# Patient Record
Sex: FEMALE | Race: OTHER | NOT HISPANIC OR LATINO | URBAN - NONMETROPOLITAN AREA
[De-identification: names, ages, dates, MRNs, and addresses within clinical notes are randomized per-mention and may not be internally consistent; named-entity substitution may affect disease eponyms.]

---

## 2017-01-04 ENCOUNTER — CONSULT (OUTPATIENT)
Dept: CARDIOLOGY | Facility: CLINIC | Age: 32
End: 2017-01-04

## 2017-01-04 ENCOUNTER — APPOINTMENT (OUTPATIENT)
Dept: LAB | Facility: HOSPITAL | Age: 32
End: 2017-01-04

## 2017-01-04 VITALS
SYSTOLIC BLOOD PRESSURE: 110 MMHG | HEIGHT: 63 IN | BODY MASS INDEX: 43.77 KG/M2 | WEIGHT: 247 LBS | HEART RATE: 112 BPM | DIASTOLIC BLOOD PRESSURE: 84 MMHG

## 2017-01-04 DIAGNOSIS — E66.01 MORBID OBESITY DUE TO EXCESS CALORIES (HCC): ICD-10-CM

## 2017-01-04 DIAGNOSIS — R07.89 OTHER CHEST PAIN: ICD-10-CM

## 2017-01-04 DIAGNOSIS — R00.2 PALPITATIONS: Primary | ICD-10-CM

## 2017-01-04 LAB
ANION GAP SERPL CALCULATED.3IONS-SCNC: 10 MMOL/L (ref 3–11)
BUN BLD-MCNC: 14 MG/DL (ref 9–23)
BUN/CREAT SERPL: 20 (ref 7–25)
CALCIUM SPEC-SCNC: 9.4 MG/DL (ref 8.7–10.4)
CHLORIDE SERPL-SCNC: 102 MMOL/L (ref 99–109)
CO2 SERPL-SCNC: 25 MMOL/L (ref 20–31)
CREAT BLD-MCNC: 0.7 MG/DL (ref 0.6–1.3)
DEPRECATED RDW RBC AUTO: 41.1 FL (ref 37–54)
ERYTHROCYTE [DISTWIDTH] IN BLOOD BY AUTOMATED COUNT: 13.7 % (ref 11.3–14.5)
GFR SERPL CREATININE-BSD FRML MDRD: 117 ML/MIN/1.73
GFR SERPL CREATININE-BSD FRML MDRD: 96 ML/MIN/1.73
GLUCOSE BLD-MCNC: 93 MG/DL (ref 70–100)
HCT VFR BLD AUTO: 40.5 % (ref 34.5–44)
HGB BLD-MCNC: 13.5 G/DL (ref 11.5–15.5)
MCH RBC QN AUTO: 27.9 PG (ref 27–31)
MCHC RBC AUTO-ENTMCNC: 33.3 G/DL (ref 32–36)
MCV RBC AUTO: 83.7 FL (ref 80–99)
PLATELET # BLD AUTO: 235 10*3/MM3 (ref 150–450)
PMV BLD AUTO: 10.4 FL (ref 6–12)
POTASSIUM BLD-SCNC: 3.8 MMOL/L (ref 3.5–5.5)
RBC # BLD AUTO: 4.84 10*6/MM3 (ref 3.89–5.14)
SODIUM BLD-SCNC: 137 MMOL/L (ref 132–146)
T4 FREE SERPL-MCNC: 1.02 NG/DL (ref 0.89–1.76)
TSH SERPL DL<=0.05 MIU/L-ACNC: 2.29 MIU/ML (ref 0.35–5.35)
WBC NRBC COR # BLD: 8.19 10*3/MM3 (ref 3.5–10.8)

## 2017-01-04 PROCEDURE — 84443 ASSAY THYROID STIM HORMONE: CPT | Performed by: PHYSICIAN ASSISTANT

## 2017-01-04 PROCEDURE — 93000 ELECTROCARDIOGRAM COMPLETE: CPT | Performed by: INTERNAL MEDICINE

## 2017-01-04 PROCEDURE — 36415 COLL VENOUS BLD VENIPUNCTURE: CPT | Performed by: INTERNAL MEDICINE

## 2017-01-04 PROCEDURE — 85027 COMPLETE CBC AUTOMATED: CPT | Performed by: PHYSICIAN ASSISTANT

## 2017-01-04 PROCEDURE — 80048 BASIC METABOLIC PNL TOTAL CA: CPT | Performed by: PHYSICIAN ASSISTANT

## 2017-01-04 PROCEDURE — 99204 OFFICE O/P NEW MOD 45 MIN: CPT | Performed by: INTERNAL MEDICINE

## 2017-01-04 PROCEDURE — 84439 ASSAY OF FREE THYROXINE: CPT | Performed by: PHYSICIAN ASSISTANT

## 2017-01-04 NOTE — LETTER
2017     LESLI Anne  [address] [facility] [address]    Patient: Ct Leonard   YOB: 1985   Date of Visit: 2017       Dear LESLI Azar:    Thank you for referring Ct Leonard to me for evaluation. Below are the relevant portions of my assessment and plan of care.    If you have questions, please do not hesitate to call me. I look forward to following Ct along with you.         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    CC:  Chief Complaint   Patient presents with   • Consult     chest pain       PROBLEM LIST:  1. Palpitations - Previously followed by [facility] for SVT/paroxysmal atrial tachycardia   A. 2016 echo: normal EF, no hemodynamically significant valvular disease   B. 2016 GXT: walked 1 min 45 seconds, no arhythmia, no ischemic ST/T wave changes. Duke treadmill score -2 with very low exercise capacity.   C.  holter: 67 isolated VE, 57 SVT events - 22 couplets - avg. rate 102 range of .  2. Dyslipidemia  3. Psoriasis  4.   5. Surgical hx: gallbladder requiring transfusion  6. Previous workup for von Willebrand's stopped due to loss of insurance.  7. PCOS  8. Morbid obesity, BMI 41. Successful 50 pound weight loss over last 3 years    Allergies  Allergies   Allergen Reactions   • Clindamycin/Lincomycin Hives and Palpitations       Current Medications  No current outpatient prescriptions on file.     History of Present Illness   HPI  Patient is a pleasant 31-year-old female with a history of dyslipidemia, psoriasis, PCO OS, who presents in consultation at the recommendation of Ray Hobbs  LESLI for palpitations and chest pain.  She was previously evaluated by [facility] in 2014 for SVT and paroxysmal regular atrial tachycardia for which was stable until August 2016.  She began noticing that when she becomes overheated and attempts to exercise with her routine walking regimen she has tachycardia palpitations, chest discomfort, dyspnea, dizziness, and nausea to the point of dry heaving.  She has noticed that caffeine is an aggravating factor.  She was given a prescription of metoprolol but has not filled it.  Her primary care provider ordered echocardiogram and GXT for evaluation.  Echocardiogram was normal.  Her GXT was intermediate risk due to her poor exercise capacity.  She had no prior changes to her health that precipitated symptom onset.  She also acknowledges positional orthopnea on her left side.  Her left lower extremity was giving her issues with swelling and there was concern for DVT/PE.  Workup at [facility] showed a negative chest x-ray and no PE on CTA.  Signs rest there are no other alleviating factors.  She avoids alcohol, caffeine, decongestants, and did not use stimulants for her weight loss.  She denies history of CHF, DVT/PE, CVA/TIA, or rheumatic fever. No chance of pregnancy as  has undergone vasectomy.    ROS  Review of Systems   Constitution: Positive for malaise/fatigue.   Cardiovascular: Positive for chest pain, dyspnea on exertion, near-syncope and palpitations.   Endocrine: Positive for cold intolerance.   Skin: Positive for dry skin.   Musculoskeletal: Positive for back pain.   Gastrointestinal: Positive for nausea.   All other systems reviewed and are negative.      SOCIAL HX  Social History     Social History   • Marital status: Unknown     Spouse name: N/A   • Number of children: N/A   • Years of education: N/A     Occupational History   • Not on file.     Social History Main Topics   • Smoking status: Never Smoker   • Smokeless tobacco: Not on  "file   • Alcohol use No   • Drug use: No   • Sexual activity: Defer     Other Topics Concern   • Not on file     Social History Narrative   • No narrative on file       FAMILY HX  Family History   Problem Relation Age of Onset   • Stroke Mother        Vitals:    01/04/17 1359 01/04/17 1407 01/04/17 1408   BP: 122/86 114/82 110/84   BP Location: Right arm Left arm Left arm   Patient Position: Sitting Sitting Standing   Pulse: 113 (!) 128 112   Weight: 247 lb (112 kg)     Height: 63\" (160 cm)         PHYSICAL EXAMINATION:  Physical Exam   Constitutional: She is oriented to person, place, and time. She appears well-developed and well-nourished. No distress.   Morbidly obese   HENT:   Head: Normocephalic and atraumatic.   Nose: Nose normal.   Mouth/Throat: Uvula is midline, oropharynx is clear and moist and mucous membranes are normal.   Eyes: Conjunctivae and EOM are normal. Pupils are equal, round, and reactive to light. No scleral icterus.   Neck: Normal range of motion. Neck supple. No hepatojugular reflux and no JVD present. Carotid bruit is not present. No tracheal deviation present. No thyromegaly present.   Cardiovascular: Regular rhythm, S1 normal, S2 normal, intact distal pulses and normal pulses.  Tachycardia present.  PMI is not displaced.  Exam reveals no gallop, no distant heart sounds, no friction rub, no midsystolic click and no opening snap.    No murmur heard.  Pulses:       Radial pulses are 2+ on the right side, and 2+ on the left side.        Dorsalis pedis pulses are 2+ on the right side, and 2+ on the left side.        Posterior tibial pulses are 2+ on the right side, and 2+ on the left side.   Pulmonary/Chest: Effort normal and breath sounds normal. She has no wheezes. She has no rhonchi. She has no rales.   Abdominal: Soft. Bowel sounds are normal. She exhibits no mass. There is no tenderness. There is no guarding.   Musculoskeletal: Normal range of motion. She exhibits no edema or tenderness. "   Lymphadenopathy:     She has no cervical adenopathy.   Neurological: She is alert and oriented to person, place, and time.   Skin: Skin is warm, dry and intact. No rash noted. No cyanosis or erythema. Nails show no clubbing.   Psychiatric: She has a normal mood and affect. Her behavior is normal.   Nursing note and vitals reviewed.      Diagnostic Data:    ECG 12 Lead  Date/Time: 1/4/2017 3:41 PM  Performed by: DOMENICA SIN  Authorized by: DOMENICA SIN   Rhythm: sinus rhythm  BPM: 88  Clinical impression: non-specific ECG and low voltage  Comments: Nonspecific ST junctional depression          Outside facility GXT, echo, and Holter reviewed and listed in the problem list above.      ASSESSMENT:   Diagnosis Plan   1. Palpitations  CBC (No Diff)    TSH    T4, Free    Basic Metabolic Panel   2. Other chest pain     3. Morbid obesity due to excess calories         PLAN:  1. Exacerbation of her known history of paroxysmal atrial tachycardia likely due to deconditioned state. Records from PCP office did not show lab work to rule out underlying physiologic etiologies and she was previously being worked up for von Willebrands. No reported signs of melena, hematochezia, or hematuria. Lab work as above. If labs are normal can consider trial of metoprolol for symptomatic improvement at next office visit. Echo normal therefore will not re-eval with holter. Discussed avoiding aggravating factors and encouraged hydration.  2. GXT shows poor exercise capacity due to her obesity. Will defer further workup at this time.  3.  Commended her on her current weight loss and encouraged that she'll continue to improve her cardiovascular function as she progresses. Dietary recommendations provided.    Follow up with us in 6 months.    Scribed for Domenica Sin MD by AMY Rick. 1/4/2017  3:48 PM   Domenica Sin MD, [facility]

## 2017-01-04 NOTE — PROGRESS NOTES
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    CC:  Chief Complaint   Patient presents with   • Consult     chest pain       PROBLEM LIST:  1. Palpitations - Previously followed by [facility] for SVT/paroxysmal atrial tachycardia   A. 2016 echo: normal EF, no hemodynamically significant valvular disease   B. 2016 GXT: walked 1 min 45 seconds, no arhythmia, no ischemic ST/T wave changes. Duke treadmill score -2 with very low exercise capacity.   C.  holter: 67 isolated VE, 57 SVT events - 22 couplets - avg. rate 102 range of .  2. Dyslipidemia  3. Psoriasis  4.   5. Surgical hx: gallbladder requiring transfusion  6. Previous workup for von Willebrand's stopped due to loss of insurance.  7. PCOS  8. Morbid obesity, BMI 41. Successful 50 pound weight loss over last 3 years    Allergies  Allergies   Allergen Reactions   • Clindamycin/Lincomycin Hives and Palpitations       Current Medications  No current outpatient prescriptions on file.     History of Present Illness   HPI  Patient is a pleasant 31-year-old female with a history of dyslipidemia, psoriasis, PCO OS, who presents in consultation at the recommendation of Ray BALLESTEROS for palpitations and chest pain.  She was previously evaluated by [facility] in  for SVT and paroxysmal regular atrial tachycardia for which was stable until 2016.  She began noticing that when she becomes overheated and attempts to exercise with her routine walking regimen she has tachycardia palpitations, chest discomfort, dyspnea, dizziness, and nausea to the point of dry heaving.  She has noticed that caffeine is an aggravating factor.  She was given a prescription of metoprolol but has not filled it.  Her primary care  "provider ordered echocardiogram and GXT for evaluation.  Echocardiogram was normal.  Her GXT was intermediate risk due to her poor exercise capacity.  She had no prior changes to her health that precipitated symptom onset.  She also acknowledges positional orthopnea on her left side.  Her left lower extremity was giving her issues with swelling and there was concern for DVT/PE.  Workup at [facility] showed a negative chest x-ray and no PE on CTA.  Signs rest there are no other alleviating factors.  She avoids alcohol, caffeine, decongestants, and did not use stimulants for her weight loss.  She denies history of CHF, DVT/PE, CVA/TIA, or rheumatic fever. No chance of pregnancy as  has undergone vasectomy.    ROS  Review of Systems   Constitution: Positive for malaise/fatigue.   Cardiovascular: Positive for chest pain, dyspnea on exertion, near-syncope and palpitations.   Endocrine: Positive for cold intolerance.   Skin: Positive for dry skin.   Musculoskeletal: Positive for back pain.   Gastrointestinal: Positive for nausea.   All other systems reviewed and are negative.      SOCIAL HX  Social History     Social History   • Marital status: Unknown     Spouse name: N/A   • Number of children: N/A   • Years of education: N/A     Occupational History   • Not on file.     Social History Main Topics   • Smoking status: Never Smoker   • Smokeless tobacco: Not on file   • Alcohol use No   • Drug use: No   • Sexual activity: Defer     Other Topics Concern   • Not on file     Social History Narrative   • No narrative on file       FAMILY HX  Family History   Problem Relation Age of Onset   • Stroke Mother        Vitals:    01/04/17 1359 01/04/17 1407 01/04/17 1408   BP: 122/86 114/82 110/84   BP Location: Right arm Left arm Left arm   Patient Position: Sitting Sitting Standing   Pulse: 113 (!) 128 112   Weight: 247 lb (112 kg)     Height: 63\" (160 cm)         PHYSICAL EXAMINATION:  Physical Exam "   Constitutional: She is oriented to person, place, and time. She appears well-developed and well-nourished. No distress.   Morbidly obese   HENT:   Head: Normocephalic and atraumatic.   Nose: Nose normal.   Mouth/Throat: Uvula is midline, oropharynx is clear and moist and mucous membranes are normal.   Eyes: Conjunctivae and EOM are normal. Pupils are equal, round, and reactive to light. No scleral icterus.   Neck: Normal range of motion. Neck supple. No hepatojugular reflux and no JVD present. Carotid bruit is not present. No tracheal deviation present. No thyromegaly present.   Cardiovascular: Regular rhythm, S1 normal, S2 normal, intact distal pulses and normal pulses.  Tachycardia present.  PMI is not displaced.  Exam reveals no gallop, no distant heart sounds, no friction rub, no midsystolic click and no opening snap.    No murmur heard.  Pulses:       Radial pulses are 2+ on the right side, and 2+ on the left side.        Dorsalis pedis pulses are 2+ on the right side, and 2+ on the left side.        Posterior tibial pulses are 2+ on the right side, and 2+ on the left side.   Pulmonary/Chest: Effort normal and breath sounds normal. She has no wheezes. She has no rhonchi. She has no rales.   Abdominal: Soft. Bowel sounds are normal. She exhibits no mass. There is no tenderness. There is no guarding.   Musculoskeletal: Normal range of motion. She exhibits no edema or tenderness.   Lymphadenopathy:     She has no cervical adenopathy.   Neurological: She is alert and oriented to person, place, and time.   Skin: Skin is warm, dry and intact. No rash noted. No cyanosis or erythema. Nails show no clubbing.   Psychiatric: She has a normal mood and affect. Her behavior is normal.   Nursing note and vitals reviewed.      Diagnostic Data:    ECG 12 Lead  Date/Time: 1/4/2017 3:41 PM  Performed by: DOMENICA HENDRIX  Authorized by: DOMENICA HENDRIX   Rhythm: sinus rhythm  BPM: 88  Clinical impression: non-specific ECG and  low voltage  Comments: Nonspecific ST junctional depression          Outside facility GXT, echo, and Holter reviewed and listed in the problem list above.      ASSESSMENT:   Diagnosis Plan   1. Palpitations  CBC (No Diff)    TSH    T4, Free    Basic Metabolic Panel   2. Other chest pain     3. Morbid obesity due to excess calories         PLAN:  1. Exacerbation of her known history of paroxysmal atrial tachycardia likely due to deconditioned state. Records from PCP office did not show lab work to rule out underlying physiologic etiologies and she was previously being worked up for von Willebrands. No reported signs of melena, hematochezia, or hematuria. Lab work as above. If labs are normal can consider trial of metoprolol for symptomatic improvement at next office visit. Echo normal therefore will not re-eval with holter. Discussed avoiding aggravating factors and encouraged hydration.  2. GXT shows poor exercise capacity due to her obesity. Will defer further workup at this time.  3.  Commended her on her current weight loss and encouraged that she'll continue to improve her cardiovascular function as she progresses. Dietary recommendations provided.    Follow up with us in 6 months.    Scribed for Paolo Sin MD by AMY Rick. 1/4/2017  3:48 PM   I, Paolo Sin MD, personally performed the services described in this documentation as scribed by the above named individual in my presence, and it is both accurate and complete.  1/4/2017  4:01 PM    Paolo Sin MD, [facility]

## 2017-01-27 ENCOUNTER — OFFICE VISIT (OUTPATIENT)
Dept: RETAIL CLINIC | Facility: CLINIC | Age: 32
End: 2017-01-27

## 2017-01-27 VITALS
BODY MASS INDEX: 44.64 KG/M2 | HEART RATE: 110 BPM | WEIGHT: 252 LBS | OXYGEN SATURATION: 100 % | TEMPERATURE: 98 F | RESPIRATION RATE: 18 BRPM | DIASTOLIC BLOOD PRESSURE: 70 MMHG | SYSTOLIC BLOOD PRESSURE: 110 MMHG

## 2017-01-27 DIAGNOSIS — N30.00 ACUTE CYSTITIS WITHOUT HEMATURIA: Primary | ICD-10-CM

## 2017-01-27 LAB
BILIRUB BLD-MCNC: NEGATIVE MG/DL
CLARITY, POC: CLEAR
COLOR UR: YELLOW
GLUCOSE UR STRIP-MCNC: NEGATIVE MG/DL
KETONES UR QL: NEGATIVE
LEUKOCYTE EST, POC: ABNORMAL
NITRITE UR-MCNC: NEGATIVE MG/ML
PH UR: 6 [PH] (ref 5–8)
PROT UR STRIP-MCNC: NEGATIVE MG/DL
RBC # UR STRIP: NEGATIVE /UL
SP GR UR: 1.03 (ref 1–1.03)
UROBILINOGEN UR QL: NORMAL

## 2017-01-27 PROCEDURE — 81003 URINALYSIS AUTO W/O SCOPE: CPT | Performed by: NURSE PRACTITIONER

## 2017-01-27 PROCEDURE — 99203 OFFICE O/P NEW LOW 30 MIN: CPT | Performed by: NURSE PRACTITIONER

## 2017-01-27 RX ORDER — SULFAMETHOXAZOLE AND TRIMETHOPRIM 800; 160 MG/1; MG/1
1 TABLET ORAL 2 TIMES DAILY
Qty: 14 TABLET | Refills: 0 | Status: SHIPPED | OUTPATIENT
Start: 2017-01-27 | End: 2017-02-03

## 2017-01-27 NOTE — PATIENT INSTRUCTIONS
You have been diagnosed with a UTI or bladder infection. An antibiotic is prescribed for you today that needs to be taken until gone, whether or not you feel better. It is recommended you take a probiotic when taking an antibiotic. Probiotics are found over the counter in pill form and in some yogurt brands, both are recommended. You should increase fluid intake such as water and low sugar cranberry juice. Avoid caffeine as it irritates the bladder wall. Take over the counter AZO Standard as needed for no more than 3 days for bladder pain. This medication will turn your urine orange or red.  Use Motrin or Tylenol for fever/pain. For recurrent infections it is best to avoid bathing in a tub, always wipe front to back, urinate before and  after intercourse, avoid tight fitting pants, and wear cotton underwear. Go to your primary care provider, urgent care center, or emergency room if no there is no improvement or if there is anyincrease in symptoms such as fever, nausea, vomiting, flank pain or other concerns. You verbalized an understanding of this plan and a visit summary is provided.     LESLI Camacho

## 2017-01-27 NOTE — MR AVS SNAPSHOT
Ct Leonard   1/27/2017 12:15 PM   Office Visit    Dept Phone:  [phone]   Encounter #:  [id number]    Provider:  Provider Bec Anderson   Department:  [facility]                Your Full Care Plan              Today's Medication Changes          These changes are accurate as of: 1/27/17 12:21 PM.  If you have any questions, ask your nurse or doctor.               New Medication(s)Ordered:     sulfamethoxazole-trimethoprim 800-160 MG per tablet   Commonly known as:  BACTRIM DS   Take 1 tablet by mouth 2 (Two) Times a Day for 7 days.   Started by:  Provider Audra Glynn            Where to Get Your Medications      These medications were sent to [facility] [address] [place] [address] - [phone] [facility] [phone] [address] [address]     Phone:  [phone]     sulfamethoxazole-trimethoprim 800-160 MG per tablet                  Your Updated Medication List          This list is accurate as of: 1/27/17 12:21 PM.  Always use your most recent med list.                sulfamethoxazole-trimethoprim 800-160 MG per tablet   Commonly known as:  BACTRIM DS   Take 1 tablet by mouth 2 (Two) Times a Day for 7 days.               You Were Diagnosed With        Codes Comments    Acute cystitis without hematuria    -  Primary ICD-10-CM: N30.00  ICD-9-CM: 595.0       Instructions    You have been diagnosed with a UTI or bladder infection. An antibiotic is prescribed for you today that needs to be taken until gone, whether or not you feel better. It is recommended you take a probiotic when taking an antibiotic. Probiotics are found over the counter in pill form and in some yogurt brands, both are recommended. You should increase fluid intake such as water and low sugar cranberry juice. Avoid caffeine as it irritates the bladder wall. Take over the counter AZO Standard as needed for no more than 3 days for bladder pain. This medication will turn your urine  orange or red.  Use Motrin or Tylenol for fever/pain. For recurrent infections it is best to avoid bathing in a tub, always wipe front to back, urinate before and  after intercourse, avoid tight fitting pants, and wear cotton underwear. Go to your primary care provider, urgent care center, or emergency room if no there is no improvement or if there is anyincrease in symptoms such as fever, nausea, vomiting, flank pain or other concerns. You verbalized an understanding of this plan and a visit summary is provided.     LESLI Camacho       Patient Instructions History      Upcoming Appointments     Visit Type Date Time Department    NEW PATIENT 1/27/2017 12:15 PM [facility]ILFRED[facility]    FOLLOW UP 7/10/2017  2:30 PM DAWNA SNOW      MyChart Signup     Our records indicate that you have declined [facility] [employer] signup. If you would like to sign up for [employer], please email [email or web address] [email or web address] or call [phone] to obtain an activation code.             Other Info from Your Visit           Your Appointments     Jul 10, 2017  2:30 PM EDT   Follow Up with Paolo Sin MD   [facility] (--)    [address] [address] [phone]           Arrive 15 minutes prior to appointment.              Allergies     Clindamycin/lincomycin  Hives, Palpitations      Vital Signs     Blood Pressure Pulse Temperature Respirations Weight Last Menstrual Period    110/70 (BP Location: Right arm, Patient Position: Sitting, Cuff Size: Large Adult) 110 98 °F (36.7 °C) (Oral) 18 252 lb (114 kg) 01/09/2017    Oxygen Saturation Body Mass Index Smoking Status             100% 44.64 kg/m2 Never Smoker         Problems and Diagnoses Noted     Bladder infection    -  Primary

## 2017-01-27 NOTE — PROGRESS NOTES
Subjective   Ct Leonard is a 31 y.o. female.     HPI Comments: Patient reports a 4 day history of dysuria, frequency, inability to empty bladder and lower abdominal pain. No fever. Drinking cranberry juice and lots of water but little water or juice today. Holds urine often due to caring for toddler and busy lifestyle. Last UTI several years ago during pregnancy.     Urinary Tract Infection    Associated symptoms include frequency and urgency. Pertinent negatives include no chills, flank pain, hematuria, nausea or vomiting.        Allergies   Allergen Reactions   • Clindamycin/Lincomycin Hives and Palpitations         The following portions of the patient's history were reviewed and updated as appropriate: allergies, current medications, past family history, past medical history, past social history, past surgical history and problem list.    Review of Systems   Constitutional: Negative for chills and fever.   Gastrointestinal: Negative for nausea and vomiting.   Genitourinary: Positive for decreased urine volume, difficulty urinating, dysuria, frequency and urgency. Negative for flank pain, hematuria, vaginal bleeding and vaginal discharge.   Musculoskeletal: Negative for back pain.   Neurological: Negative for headaches.       Objective     Visit Vitals   • /70 (BP Location: Right arm, Patient Position: Sitting, Cuff Size: Large Adult)   • Pulse 110   • Temp 98 °F (36.7 °C) (Oral)   • Resp 18   • Wt 252 lb (114 kg)   • LMP 01/09/2017   • SpO2 100%   • BMI 44.64 kg/m2       Physical Exam  General Appearance:  Well-appearing, well-developed, well hydrated, well nourished, no acute distress, alert and oriented, appears stated age, comfortable, pleasant, cooperative  Head/face:  Normocephalic, atraumatic  Eyes:  Pupils equal, sclera clear, EOMI  Lungs:  Clear to auscultation bilaterally  Heart:  Regular rate and rhythm without murmur, gallop or rub  Abdomen:  Generally tender to palpation throughout all  quadrants; no hepatosplenomegaly or masses; no guarding or rebound tenderness; no CVA tenderness  Neurologic:  Alert; no focal deficits; age appropriate behavior and speech    POC Urinalysis Dipstick, Automated   Order: [id number]   Status:  Final result   Visible to patient:  No (Not Released) Dx:  Acute cystitis without hematuria      Ref Range & Units 12:31 PM     Color Yellow, Straw, Dark Yellow, Gypsy Yellow   Clarity, UA Clear Clear   Glucose, UA Negative, 1000 mg/dL (3+) mg/dL Negative   Bilirubin Negative Negative   Ketones, UA Negative Negative   Specific Gravity  1.005 - 1.030 1.030   Blood, UA Negative Negative   pH, Urine 5.0 - 8.0 6.0   Protein, POC Negative mg/dL Negative   Urobilinogen, UA Normal Normal   Leukocytes Negative Trace (A)   Nitrite, UA Negative Negative             Assessment/Plan   Ct was seen today for urinary tract infection.    Diagnoses and all orders for this visit:    Acute cystitis without hematuria  -     POC Urinalysis Dipstick, Automated    Other orders  -     sulfamethoxazole-trimethoprim (BACTRIM DS) 800-160 MG per tablet; Take 1 tablet by mouth 2 (Two) Times a Day for 7 days.    Discussed dosing, side effects, recommended other symptomatic care.  Patient instructions and visit summary given as documented. Patient should follow up with primary care provider if symptoms worsen, fail to resolve or other symptoms need attention. Patient/family agree to the above.            LESLI Camacho

## 2017-07-10 ENCOUNTER — OFFICE VISIT (OUTPATIENT)
Dept: CARDIOLOGY | Facility: CLINIC | Age: 32
End: 2017-07-10

## 2017-07-10 VITALS
HEART RATE: 136 BPM | SYSTOLIC BLOOD PRESSURE: 132 MMHG | HEIGHT: 63 IN | DIASTOLIC BLOOD PRESSURE: 88 MMHG | WEIGHT: 245 LBS | BODY MASS INDEX: 43.41 KG/M2

## 2017-07-10 DIAGNOSIS — R00.2 PALPITATIONS: Primary | ICD-10-CM

## 2017-07-10 PROCEDURE — 99213 OFFICE O/P EST LOW 20 MIN: CPT | Performed by: INTERNAL MEDICINE

## 2017-07-10 RX ORDER — METOPROLOL SUCCINATE 25 MG/1
25 TABLET, EXTENDED RELEASE ORAL DAILY
Qty: 90 TABLET | Refills: 3 | Status: SHIPPED | OUTPATIENT
Start: 2017-07-10

## 2017-07-10 NOTE — PROGRESS NOTES
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 Palpitations - Previously followed by [facility] for SVT/paroxysmal atrial tachycardia   A. 2016 echo: normal EF, no hemodynamically significant valvular disease   B. 2016 GXT: walked 1 min 45 seconds, no arhythmia, no ischemic ST/T wave changes. Duke treadmill score -2 with very low exercise capacity.   C.  holter: 67 isolated VE, 57 SVT events - 22 couplets - avg. rate 102 range of .  2. Dyslipidemia  3. Psoriasis  4.   5. Surgical hx: gallbladder requiring transfusion  6. Previous workup for von Willebrand's stopped due to loss of insurance.  7. PCOS  8. Morbid obesity, BMI 41. Successful 50 pound weight loss over last 3 years    Allergies  Allergies   Allergen Reactions   • Clindamycin/Lincomycin Hives and Palpitations       Current Medications    Current Outpatient Prescriptions:   •  metoprolol succinate XL (TOPROL-XL) 25 MG 24 hr tablet, Take 1 tablet by mouth Daily., Disp: 90 tablet, Rfl: 3      History of Present Illness   Pt here for follow up on POTS. Reports daily symptoms, worse with any activity or heat. Pt reports dyspnea associated with the tachycardia that worse in the heat. Reports occasional chest tightness with the symptoms, especially when she's short of breath.  Pt denies any other chest pain, dyspnea, orthopnea, PND, or claudication.    ROS:  All systems have been reviewed and are negative with the exception of those mentioned in the HPI.    OBJECTIVE:  Vitals:    07/10/17 1449   BP: 132/88   BP Location: Right arm   Patient Position: Sitting   Pulse: (!) 136  "  Weight: 245 lb (111 kg)   Height: 63\" (160 cm)     Physical Exam   Constitutional: She is oriented to person, place, and time. She appears well-developed and well-nourished. No distress.   Neck: Neck supple. No JVD present. No tracheal deviation present.   Cardiovascular: Regular rhythm, normal heart sounds and intact distal pulses.  Tachycardia present.    No murmur heard.  Pulmonary/Chest: Effort normal and breath sounds normal. She has no wheezes. She has no rales.   Abdominal: Soft. Bowel sounds are normal. There is no tenderness. There is no guarding.   Musculoskeletal: She exhibits edema (trace). She exhibits no tenderness.   Neurological: She is alert and oriented to person, place, and time.   Nursing note and vitals reviewed.      Diagnostic Data:  Procedures      ASSESSMENT:   Diagnosis Plan   1. Palpitations  metoprolol succinate XL (TOPROL-XL) 25 MG 24 hr tablet       PLAN:  1. Due to POTS. Pt counseled that she can try using compression stockings on days where her legs are swelling. BP acceptable today, will try metoprolol to control HR with hopes that it doesn't cause hypotension.     LESLI Anne, thank you for referring Ms. Leonard for evaluation.  I have forwarded my electronically generated recommendations to you for review.  Please do not hesitate to call with any questions.    Scribed for Paolo Sin MD by Teresita Wiseman PA-C. 7/10/2017  3:28 PM  IPaolo MD, personally performed the services described in this documentation as scribed by the above named individual in my presence, and it is both accurate and complete.  7/10/2017  3:43 PM    Paolo Sin MD, FAC  "

## 2024-02-23 ENCOUNTER — HOSPITAL ENCOUNTER (EMERGENCY)
Facility: HOSPITAL | Age: 39
Discharge: HOME | End: 2024-02-23
Attending: EMERGENCY MEDICINE
Payer: COMMERCIAL

## 2024-02-23 ENCOUNTER — APPOINTMENT (OUTPATIENT)
Dept: ULTRASOUND IMAGING | Facility: HOSPITAL | Age: 39
End: 2024-02-23
Payer: COMMERCIAL

## 2024-02-23 VITALS
HEART RATE: 95 BPM | RESPIRATION RATE: 18 BRPM | BODY MASS INDEX: 42.52 KG/M2 | TEMPERATURE: 98.2 F | WEIGHT: 240 LBS | HEIGHT: 63 IN | OXYGEN SATURATION: 96 % | SYSTOLIC BLOOD PRESSURE: 131 MMHG | DIASTOLIC BLOOD PRESSURE: 96 MMHG

## 2024-02-23 DIAGNOSIS — Z3A.01 LESS THAN 8 WEEKS GESTATION OF PREGNANCY: Primary | ICD-10-CM

## 2024-02-23 DIAGNOSIS — N83.201 CYST OF RIGHT OVARY: ICD-10-CM

## 2024-02-23 LAB
B-HCG UR QL: POSITIVE
BACTERIA UR QL AUTO: ABNORMAL /HPF
BILIRUB UR QL STRIP: NEGATIVE
CLARITY UR: CLEAR
COLOR UR: YELLOW
EXPIRATION DATE: ABNORMAL
GLUCOSE UR STRIP-MCNC: NEGATIVE MG/DL
HGB UR QL STRIP.AUTO: NEGATIVE
HYALINE CASTS UR QL AUTO: ABNORMAL /LPF
INTERNAL NEGATIVE CONTROL: NEGATIVE
INTERNAL POSITIVE CONTROL: POSITIVE
KETONES UR QL STRIP: NEGATIVE
LEUKOCYTE ESTERASE UR QL STRIP.AUTO: ABNORMAL
Lab: ABNORMAL
NITRITE UR QL STRIP: NEGATIVE
PH UR STRIP.AUTO: 7.5 [PH] (ref 5–8)
PROT UR QL STRIP: NEGATIVE
RBC # UR STRIP: ABNORMAL /HPF
REF LAB TEST METHOD: ABNORMAL
SP GR UR STRIP: 1.02 (ref 1–1.03)
SQUAMOUS #/AREA URNS HPF: ABNORMAL /HPF
UROBILINOGEN UR QL STRIP: ABNORMAL
WBC # UR STRIP: ABNORMAL /HPF

## 2024-02-23 PROCEDURE — 81025 URINE PREGNANCY TEST: CPT | Performed by: EMERGENCY MEDICINE

## 2024-02-23 PROCEDURE — 81001 URINALYSIS AUTO W/SCOPE: CPT

## 2024-02-23 PROCEDURE — 81025 URINE PREGNANCY TEST: CPT

## 2024-02-23 PROCEDURE — 99284 EMERGENCY DEPT VISIT MOD MDM: CPT

## 2024-02-23 PROCEDURE — 76817 TRANSVAGINAL US OBSTETRIC: CPT

## 2024-02-23 RX ORDER — ACETAMINOPHEN 500 MG
1000 TABLET ORAL ONCE
Status: COMPLETED | OUTPATIENT
Start: 2024-02-23 | End: 2024-02-23

## 2024-02-23 RX ADMIN — ACETAMINOPHEN 1000 MG: 500 TABLET ORAL at 13:34

## 2024-02-23 NOTE — DISCHARGE INSTRUCTIONS
Warm compresses can be helpful for ovarian cyst, Tylenol as well is safe in pregnancy, prenatal vitamin with folic acid.

## 2024-02-23 NOTE — Clinical Note
[facility] EMERGENCY DEPARTMENT  [address]  [facility] [address]  Phone: [phone]    Ct Leonard was seen and treated in our emergency department on 2/23/2024.  She may return to work on 02/26/2024.         Thank you for choosing [facility].    Jay Rutherford MD

## 2024-02-23 NOTE — ED PROVIDER NOTES
Subjective   History of Present Illness patient is a 38-year-old female who presents to the emergency department complaining of right-sided abdominal cramping, and she reported a positive urine pregnancy test at home.  Denies vaginal bleeding or discharge.  Patient reports her last menstrual period was approximately mid January, she reports the time of intercourse likely pregnancy was January 25, 2024.  Patient reports not taking any medicine prior to arrival, and reports desiring follow-up with OB/GYN Dr. Rebecca Hope.  .  Review of Systems   Constitutional: Negative.    HENT: Negative.     Eyes: Negative.    Respiratory: Negative.     Gastrointestinal:  Positive for abdominal pain.   Genitourinary: Negative.  Positive for pelvic pain.   Musculoskeletal: Negative.    Neurological: Negative.        Past Medical History:   Diagnosis Date    Chest pain     Elevated cholesterol     Hyperlipidemia     Supraventricular tachycardia     Urinary tract infection        Allergies   Allergen Reactions    Clindamycin/Lincomycin Hives and Palpitations       Past Surgical History:   Procedure Laterality Date    CHOLECYSTECTOMY  2014       Family History   Problem Relation Age of Onset    Stroke Mother     No Known Problems Father        Social History     Socioeconomic History    Marital status:    Tobacco Use    Smoking status: Never    Smokeless tobacco: Never   Substance and Sexual Activity    Alcohol use: No    Drug use: No    Sexual activity: Defer     Comment:  had vasectomy           Objective   Physical Exam  Constitutional:       Appearance: Normal appearance. She is obese.   HENT:      Head: Normocephalic.   Eyes:      Extraocular Movements: Extraocular movements intact.      Conjunctiva/sclera: Conjunctivae normal.      Pupils: Pupils are equal, round, and reactive to light.   Cardiovascular:      Rate and Rhythm: Normal rate.      Pulses: Normal pulses.   Pulmonary:      Effort: Pulmonary effort is  normal.   Abdominal:      General: Abdomen is flat. Bowel sounds are normal. There is no distension.      Palpations: Abdomen is soft. There is no mass.      Tenderness: There is abdominal tenderness. There is no right CVA tenderness, left CVA tenderness or guarding.   Musculoskeletal:         General: Normal range of motion.      Cervical back: Normal range of motion.   Skin:     General: Skin is warm and dry.      Capillary Refill: Capillary refill takes less than 2 seconds.   Neurological:      General: No focal deficit present.      Mental Status: She is alert and oriented to person, place, and time.   Psychiatric:         Attention and Perception: Attention and perception normal.         Behavior: Behavior normal. Behavior is cooperative.         Cognition and Memory: Cognition and memory normal.         Judgment: Judgment normal.         Procedures           ED Course  ED Course as of 02/23/24 1454   Fri Feb 23, 2024   1330 Initially evaluated in ED split flow room 25.  [JH]   1436 Noted patient's ultrasound result with single intra uterine living fetus, and right ovarian cyst likely etiologic for her discomfort.  Communicate to the patient, and follow-up with her OB/GYN recommendation. [JH]      ED Course User Index  [] Benjamin Mendenhall APRN                                             Medical Decision Making  The patient's presenting symptoms, and report of previous test results, differential diagnosis includes pregnancy, urinary tract infection, threatened miscarriage, ectopic pregnancy, ovarian cyst, ovarian torsion.  Patient will have urinalysis evaluated, as well as transvaginal ultrasound, with results to be communicated disposition including follow-up with OB/GYN and women's health care provider provided.  Patient is agreeable with this plan.    Problems Addressed:  Cyst of right ovary: complicated acute illness or injury  Less than 8 weeks gestation of pregnancy: complicated acute illness or  injury    Amount and/or Complexity of Data Reviewed  Labs: ordered.  Radiology: ordered.    Risk  OTC drugs.        Final diagnoses:   Less than 8 weeks gestation of pregnancy   Cyst of right ovary       ED Disposition  ED Disposition       ED Disposition   Discharge    Condition   Stable    Comment   --               Rebecca Hope MD  [address]  [address]  [place] [address]  [phone]    Schedule an appointment as soon as possible for a visit   Follow-up with Dr. Hope at approximately the 12-week lyly or earlier if you have concerning symptoms.    [facility]  [address] [place] [address]  [facility] [id number] [phone]             Medication List      No changes were made to your prescriptions during this visit.            Benjamin Mendenhall, APRN  02/23/24 5498

## 2024-12-09 ENCOUNTER — APPOINTMENT (OUTPATIENT)
Dept: MRI IMAGING | Facility: HOSPITAL | Age: 39
End: 2024-12-09
Payer: COMMERCIAL

## 2024-12-09 ENCOUNTER — HOSPITAL ENCOUNTER (EMERGENCY)
Facility: HOSPITAL | Age: 39
Discharge: HOME | End: 2024-12-09
Attending: STUDENT IN AN ORGANIZED HEALTH CARE EDUCATION/TRAINING PROGRAM | Admitting: STUDENT IN AN ORGANIZED HEALTH CARE EDUCATION/TRAINING PROGRAM
Payer: COMMERCIAL

## 2024-12-09 VITALS
HEIGHT: 63 IN | SYSTOLIC BLOOD PRESSURE: 123 MMHG | RESPIRATION RATE: 16 BRPM | WEIGHT: 247.2 LBS | HEART RATE: 98 BPM | TEMPERATURE: 98.7 F | BODY MASS INDEX: 43.8 KG/M2 | OXYGEN SATURATION: 94 % | DIASTOLIC BLOOD PRESSURE: 80 MMHG

## 2024-12-09 DIAGNOSIS — R74.01 TRANSAMINITIS: ICD-10-CM

## 2024-12-09 DIAGNOSIS — R10.11 RUQ ABDOMINAL PAIN: ICD-10-CM

## 2024-12-09 DIAGNOSIS — R74.8 ELEVATED LIVER ENZYMES: Primary | ICD-10-CM

## 2024-12-09 LAB
ALBUMIN SERPL-MCNC: 3.6 G/DL (ref 3.5–5.2)
ALBUMIN/GLOB SERPL: 1.1 G/DL
ALP SERPL-CCNC: 278 U/L (ref 39–117)
ALT SERPL W P-5'-P-CCNC: 446 U/L (ref 1–33)
ANION GAP SERPL CALCULATED.3IONS-SCNC: 10.8 MMOL/L (ref 5–15)
AST SERPL-CCNC: 235 U/L (ref 1–32)
BILIRUB SERPL-MCNC: 0.7 MG/DL (ref 0–1.2)
BUN SERPL-MCNC: 12 MG/DL (ref 6–20)
BUN/CREAT SERPL: 13 (ref 7–25)
CALCIUM SPEC-SCNC: 9.1 MG/DL (ref 8.6–10.5)
CHLORIDE SERPL-SCNC: 103 MMOL/L (ref 98–107)
CO2 SERPL-SCNC: 23.2 MMOL/L (ref 22–29)
CREAT SERPL-MCNC: 0.92 MG/DL (ref 0.57–1)
DEPRECATED RDW RBC AUTO: 42.1 FL (ref 37–54)
EGFRCR SERPLBLD CKD-EPI 2021: 80.4 ML/MIN/1.73
ERYTHROCYTE [DISTWIDTH] IN BLOOD BY AUTOMATED COUNT: 13.7 % (ref 12.3–15.4)
GLOBULIN UR ELPH-MCNC: 3.2 GM/DL
GLUCOSE SERPL-MCNC: 92 MG/DL (ref 65–99)
HCT VFR BLD AUTO: 42.2 % (ref 34–46.6)
HGB BLD-MCNC: 14 G/DL (ref 12–15.9)
LIPASE SERPL-CCNC: 37 U/L (ref 13–60)
LYMPHOCYTES # BLD MANUAL: 3.58 10*3/MM3 (ref 0.7–3.1)
LYMPHOCYTES NFR BLD MANUAL: 6 % (ref 5–12)
MCH RBC QN AUTO: 27.8 PG (ref 26.6–33)
MCHC RBC AUTO-ENTMCNC: 33.2 G/DL (ref 31.5–35.7)
MCV RBC AUTO: 83.9 FL (ref 79–97)
MONOCYTES # BLD: 0.35 10*3/MM3 (ref 0.1–0.9)
NEUTROPHILS # BLD AUTO: 1.94 10*3/MM3 (ref 1.7–7)
NEUTROPHILS NFR BLD MANUAL: 33 % (ref 42.7–76)
PLAT MORPH BLD: NORMAL
PLATELET # BLD AUTO: 93 10*3/MM3 (ref 140–450)
PMV BLD AUTO: 10.6 FL (ref 6–12)
POTASSIUM SERPL-SCNC: 3.7 MMOL/L (ref 3.5–5.2)
PROT SERPL-MCNC: 6.8 G/DL (ref 6–8.5)
RBC # BLD AUTO: 5.03 10*6/MM3 (ref 3.77–5.28)
RBC MORPH BLD: NORMAL
SCAN SLIDE: NORMAL
SODIUM SERPL-SCNC: 137 MMOL/L (ref 136–145)
VARIANT LYMPHS NFR BLD MANUAL: 18 % (ref 0–5)
VARIANT LYMPHS NFR BLD MANUAL: 43 % (ref 19.6–45.3)
WBC MORPH BLD: NORMAL
WBC NRBC COR # BLD AUTO: 5.87 10*3/MM3 (ref 3.4–10.8)

## 2024-12-09 PROCEDURE — 36415 COLL VENOUS BLD VENIPUNCTURE: CPT

## 2024-12-09 PROCEDURE — 99284 EMERGENCY DEPT VISIT MOD MDM: CPT | Performed by: STUDENT IN AN ORGANIZED HEALTH CARE EDUCATION/TRAINING PROGRAM

## 2024-12-09 PROCEDURE — 80074 ACUTE HEPATITIS PANEL: CPT

## 2024-12-09 PROCEDURE — 85007 BL SMEAR W/DIFF WBC COUNT: CPT

## 2024-12-09 PROCEDURE — 80053 COMPREHEN METABOLIC PANEL: CPT

## 2024-12-09 PROCEDURE — 83690 ASSAY OF LIPASE: CPT

## 2024-12-09 PROCEDURE — 85025 COMPLETE CBC W/AUTO DIFF WBC: CPT

## 2024-12-09 PROCEDURE — 74181 MRI ABDOMEN W/O CONTRAST: CPT

## 2024-12-10 LAB
HAV IGM SERPL QL IA: NORMAL
HBV CORE IGM SERPL QL IA: NORMAL
HBV SURFACE AG SERPL QL IA: NORMAL
HCV AB SER QL: NORMAL

## 2024-12-10 NOTE — ED PROVIDER NOTES
I evaluated this patient in conjunction with the OPAL.  I personally performed a history and physical examination.  I agree with OPAL's documented history, physical and medical decision making.    Case was discussed with Dr. Garrison who recommended MRCP and if negative patient may follow-up with him in clinic.    MRCP has been performed and per radiology is negative for acute findings.    On my examination patient well-appearing and nontoxic.  She is discharged with instructions to follow-up with Dr. Garrison in clinic and to return to the [facility] apartment before then for any concerning symptoms or new concerns     Diagnosis Plan   1. Elevated liver enzymes  Ambulatory Referral to Gastroenterology      2. RUQ abdominal pain  Ambulatory Referral to Gastroenterology      3. Transaminitis            ED Disposition       ED Disposition   Discharge    Condition   Stable    Comment   --                  sAh Gaffney MD  12/10/24 3686

## 2024-12-10 NOTE — DISCHARGE INSTRUCTIONS
As we discussed, your liver enzymes are elevated.  It is important for you to follow-up with gastroenterology regarding this.  You also need to follow-up with gynecology regarding your known ovarian cyst.  You should follow-up with primary care within 1 week.  You should return to the emergency department for any fever, vomiting, inability to eat or drink or for any concerning symptoms or new concerns.

## 2024-12-10 NOTE — ED PROVIDER NOTES
" EMERGENCY DEPARTMENT ENCOUNTER    Pt Name: Ct Leonard  MRN: [id number]  Pt :   1985  Room Number:  19SF/19  Date of encounter:  2024  PCP: Ray Hobbs APRN  ED Provider: Reno Wilson PA-C    Historian: Patient, nursing notes      HPI:  Chief Complaint: Abdominal pain, nausea        Context: Ct Leonard is a 39 y.o. female who presents to the ED c/o 1 week history of fatigue, right upper quadrant abdominal pain, intermittent nausea.  Patient states she went to the [facility] emergency department today and had a workup there including a CT scan of her abdomen and pelvis and a transvaginal ultrasound.  Patient states she was told that she may have hepatitis by that emergency department and she is presenting here to our facility today to get a \"second opinion\".      PAST MEDICAL HISTORY  Past Medical History:   Diagnosis Date    Chest pain     Elevated cholesterol     Hyperlipidemia     Supraventricular tachycardia     Urinary tract infection          PAST SURGICAL HISTORY  Past Surgical History:   Procedure Laterality Date    APPENDECTOMY      CHOLECYSTECTOMY           FAMILY HISTORY  Family History   Problem Relation Age of Onset    Stroke Mother     No Known Problems Father          SOCIAL HISTORY  Social History     Socioeconomic History    Marital status:    Tobacco Use    Smoking status: Never    Smokeless tobacco: Never   Substance and Sexual Activity    Alcohol use: No    Drug use: No    Sexual activity: Defer     Comment:  had vasectomy         ALLERGIES  Clindamycin/lincomycin        REVIEW OF SYSTEMS  Review of Systems   Constitutional:  Positive for fatigue. Negative for chills and fever.   HENT:  Negative for congestion and sore throat.    Respiratory:  Negative for cough and shortness of breath.    Cardiovascular:  Negative for chest pain.   Gastrointestinal:  Positive for abdominal pain and nausea. Negative for vomiting.   Genitourinary:  Negative for " dysuria.   Musculoskeletal:  Negative for back pain.   Skin:  Negative for wound.   Neurological:  Negative for dizziness and headaches.   Psychiatric/Behavioral:  Negative for confusion.    All other systems reviewed and are negative.         All systems reviewed and negative except for those discussed in HPI.       PHYSICAL EXAM    I have reviewed the triage vital signs and nursing notes.    ED Triage Vitals [12/09/24 1915]   Temp Heart Rate Resp BP SpO2   98.7 °F (37.1 °C) 98 16 126/89 99 %      Temp src Heart Rate Source Patient Position BP Location FiO2 (%)   Oral Monitor Sitting Left arm --       Physical Exam  Vitals and nursing note reviewed.   Constitutional:       General: She is not in acute distress.     Appearance: She is not ill-appearing, toxic-appearing or diaphoretic.   HENT:      Head: Normocephalic and atraumatic.      Mouth/Throat:      Mouth: Mucous membranes are moist.      Pharynx: Oropharynx is clear.   Eyes:      Extraocular Movements: Extraocular movements intact.   Cardiovascular:      Rate and Rhythm: Normal rate.      Heart sounds: Normal heart sounds.   Pulmonary:      Effort: Pulmonary effort is normal. No respiratory distress.      Breath sounds: Normal breath sounds.   Abdominal:      Tenderness: There is abdominal tenderness in the right upper quadrant and periumbilical area.   Skin:     General: Skin is warm and dry.      Findings: No rash.   Neurological:      Mental Status: She is alert.             LAB RESULTS  Recent Results (from the past 24 hours)   Comprehensive Metabolic Panel    Collection Time: 12/09/24  9:33 PM    Specimen: Blood   Result Value Ref Range    Glucose 92 65 - 99 mg/dL    BUN 12 6 - 20 mg/dL    Creatinine 0.92 0.57 - 1.00 mg/dL    Sodium 137 136 - 145 mmol/L    Potassium 3.7 3.5 - 5.2 mmol/L    Chloride 103 98 - 107 mmol/L    CO2 23.2 22.0 - 29.0 mmol/L    Calcium 9.1 8.6 - 10.5 mg/dL    Total Protein 6.8 6.0 - 8.5 g/dL    Albumin 3.6 3.5 - 5.2 g/dL    ALT  (SGPT) 446 (H) 1 - 33 U/L    AST (SGOT) 235 (H) 1 - 32 U/L    Alkaline Phosphatase 278 (H) 39 - 117 U/L    Total Bilirubin 0.7 0.0 - 1.2 mg/dL    Globulin 3.2 gm/dL    A/G Ratio 1.1 g/dL    BUN/Creatinine Ratio 13.0 7.0 - 25.0    Anion Gap 10.8 5.0 - 15.0 mmol/L    eGFR 80.4 >60.0 mL/min/1.73   Lipase    Collection Time: 12/09/24  9:33 PM    Specimen: Blood   Result Value Ref Range    Lipase 37 13 - 60 U/L   CBC Auto Differential    Collection Time: 12/09/24  9:33 PM    Specimen: Blood   Result Value Ref Range    WBC 5.87 3.40 - 10.80 10*3/mm3    RBC 5.03 3.77 - 5.28 10*6/mm3    Hemoglobin 14.0 12.0 - 15.9 g/dL    Hematocrit 42.2 34.0 - 46.6 %    MCV 83.9 79.0 - 97.0 fL    MCH 27.8 26.6 - 33.0 pg    MCHC 33.2 31.5 - 35.7 g/dL    RDW 13.7 12.3 - 15.4 %    RDW-SD 42.1 37.0 - 54.0 fl    MPV 10.6 6.0 - 12.0 fL    Platelets 93 (L) 140 - 450 10*3/mm3   Scan Slide    Collection Time: 12/09/24  9:33 PM    Specimen: Blood   Result Value Ref Range    Scan Slide     Manual Differential    Collection Time: 12/09/24  9:33 PM    Specimen: Blood   Result Value Ref Range    Neutrophil % 33.0 (L) 42.7 - 76.0 %    Lymphocyte % 43.0 19.6 - 45.3 %    Monocyte % 6.0 5.0 - 12.0 %    Atypical Lymphocyte % 18.0 (H) 0.0 - 5.0 %    Neutrophils Absolute 1.94 1.70 - 7.00 10*3/mm3    Lymphocytes Absolute 3.58 (H) 0.70 - 3.10 10*3/mm3    Monocytes Absolute 0.35 0.10 - 0.90 10*3/mm3    RBC Morphology Normal Normal    WBC Morphology Normal Normal    Platelet Morphology Normal Normal       If labs were ordered, I independently reviewed the results and considered them in treating the patient.      RADIOLOGY  MRI abdomen wo contrast mrcp    Result Date: 12/9/2024  FINAL REPORT TECHNIQUE: null CLINICAL HISTORY: RUQ pain, transaminitis ELEVATED LIVER ENZYMES. MRCP. COMPARISON: null FINDINGS: MRI ABDOMEN WITHOUT CONTRAST MRCP STUDY COMPARISON: None. FINDINGS: MRI ABDOMEN: The liver is unremarkable. No evidence of a fatty liver. The gallbladder is  absent. Stomach is underdistended which limits assessment. Tiny hiatal hernia is questioned. Pancreas is unremarkable. Spleen, adrenal glands, and both kidneys are unremarkable. Abdominal aorta is normal in caliber, without evidence of an aneurysm. There is no adenopathy or fluid collection. MRCP STUDY: Dedicated MRCP imaging was performed. Common bile duct measures up to 4-5 mm in caliber, within normal limits. No evidence of a filling defect to suggest choledocholithiasis.     IMPRESSION: 1. MRI abdomen demonstrates no acute disease. Gallbladder is absent. 2. MRCP study demonstrates no evidence of choledocholithiasis. Common bile duct measures up to 4-5 mm in caliber, within normal limits. Authenticated and Electronically Signed by Umang Harvey MD on 12/09/2024 10:48:52 PM       PROCEDURES    Procedures    No orders to display       MEDICATIONS GIVEN IN ER    Medications - No data to display      MEDICAL DECISION MAKING, PROGRESS, and CONSULTS    All labs, if obtained, have been independently reviewed by me.  All radiology studies, if obtained, have been reviewed by me and the radiologist dictating the report.  All EKG's, if obtained, have been independently viewed and interpreted by me/my attending physician.      Discussion below represents my analysis of pertinent findings related to patient's condition, differential diagnosis, treatment plan and final disposition.    Patient is a 39-year-old female presenting to ER for evaluation of a 1 week history of right upper quadrant abdominal pain, fatigue and chills symptoms.  On exam the patient is upright and alert, she is in mild to moderate pain when palpating on the right upper quadrant with no guarding or rebound.  Her vitals and pulse oximetry is independently interpreted by me-year-old stable and within normal limits.  Plan will be for laboratory workup and review of the patient's emergency department labs and imaging studies for that were performed earlier  today at [facility].    I reviewed the patient's records that were faxed from [facility] showing that the patient had transaminitis, a CT abdomen and pelvis study showing a left ovarian cyst and a transvaginal ultrasound again reiterating the ovarian cyst but no other acute findings on imaging.  My review of her old labs are listed below.  I consulted gastroenterologist Dr. Garrison who recommended that the patient receive an MRCP scan, if MRCP showed no evidence of retained stone she could follow-up with him on an outpatient basis.    Pending MRI results patient's care was transferred to ED attending Dr. Gaffney at 10:30 PM.                     Differential diagnosis:    Differential diagnosis included but was not limited to acute hepatitis, cirrhosis, viral syndrome      Additional sources:    - Discussed/ obtained information from independent historians: None    - External (non-ED) record review: I had records of the patient's laboratory workup, CT abdomen and pelvis, and transvaginal ultrasound faxed to me from the [facility] emergency department.  This review of records shows that the patient had an ALT of 500, and AST of 330, and alkaline phosphatase of 238.  Normal hemoglobin and hematocrit, no leukocytosis, unremarkable urinalysis.  Transvaginal ultrasound per radiologist showed a complex likely hemorrhagic left ovarian cyst otherwise unremarkable ultrasound.  CT abdomen and pelvis performed this morning per radiologist report showed findings consistent with left ovarian cyst but no other acute pathology in the abdomen or pelvis.    - Chronic or social conditions impacting care: None    Orders placed during this visit:  Orders Placed This Encounter   Procedures    MRI abdomen wo contrast mrcp    Hepatitis Panel, Acute    Comprehensive Metabolic Panel    Lipase    CBC Auto Differential    Scan Slide    Manual Differential    Ambulatory Referral to Gastroenterology    CBC & Differential          Additional orders considered but not ordered: None      ED Course:    Consultants: None    ED Course as of 12/10/24 1119   Mon Dec 09, 2024   2000 I have reviewed the mid-level provider(s) note and verbally discussed the case/plan of care.  I was available for consultation as needed at all times during the patient's visit in the Emergency Department.  I agree with the clinical impression, plan, and disposition unless otherwise stated in the MDM below.    ATTENDING ATTESTATION  HPI: 39-year-old female presents with complaints of fatigue and right upper quadrant abdominal pain.  Reportedly was seen at [facility] ER today. Had extensive workup including CT and US. Reportedly diagnosed with hepatitis. Discharged home with outpatient follow-up. Presents here for second opinion.    MDM: Records from outside hospital obtained. Transaminitis present with liver enzymes in the mid 500's. Total bilirubin 1.2.  Case was discussed with on-call gastroenterologist here who recommended MRCP.   [JS]      ED Course User Index  [JS] Calvin Perdomo DO              Shared Decision Making:  After my consideration of clinical presentation and any laboratory/radiology studies obtained, I discussed the findings with the patient/patient representative who is in agreement with the treatment plan and the final disposition.   Risks and benefits of discharge and/or observation/admission were discussed.       AS OF 11:19 EST VITALS:    BP - 123/80  HR - 98  TEMP - 98.7 °F (37.1 °C) (Oral)  O2 SATS - 94%                  DIAGNOSIS  Final diagnoses:   Elevated liver enzymes   RUQ abdominal pain   Transaminitis         DISPOSITION  Discharge home      Please note that portions of this document were completed with voice recognition software.      Reno Wilson PA-C  12/10/24 1119

## 2024-12-11 LAB — REF LAB TEST METHOD: NORMAL
